# Patient Record
(demographics unavailable — no encounter records)

---

## 2025-03-04 NOTE — ASSESSMENT
[FreeTextEntry1] : chronic constipation a/w intermittent rectal bleeding and iron deficiency.  labs as listed colonoscopy recommended to r/o lesion Risks (including but not limited to sedation risks, infection, bleeding, perforation, possibility of missed lesions, spleen injury), benefits and alternatives to procedure, including not doing the procedure, were discussed with patient. Patient understood and agreed to proceed with colonoscopy. Colonoscopy preparation instructions reviewed with patient. 2 Dulcolax two days prior to procedure + Split MiraLAX/Dulcolax preparation starting day prior to procedure  daily MiraLAX for goal 1 bm daily - QOD  iron deficiency -outside labs requested -check labs -EGD/Colonoscopy to r/o GI source.  Risks (including but not limited to sedation risks, infection, bleeding, perforation), benefits and alternatives to procedure, including not doing the procedure, were discussed with patient. Patient understood and agreed to proceed with EGD EGD preparation instructions reviewed with patient. -minimize NSAIDS

## 2025-03-04 NOTE — HISTORY OF PRESENT ILLNESS
[FreeTextEntry1] : 22 year old F presents for the evaluation of chronic constipation, iron deficiency  and rectal bleeding.  seen at the request of Dr. Chloé Acosta,   h/o chronic constipation-most of her life.  she has bm qQOD- every 3 days drinks coffee to have a bm  had a egd/colonoscopy when she was younger-age 15- 16-, eating d/o contributed to constipation. used to be addicted to laxatives- dulcolax, exlax. she believes results were unremarkable.  she is pescatarian . does not get her period on current OCP so last period 1.5 years ago.   white stuff in stool for last few months. lot of mucous recently. very constipated 2 weeks ago when skiing. Had abd pain.  was only able to mass mucous. took a laxative with good result, abd pain resolved.  some BRB on occasion, -mostly on tissue with wiping.   started taking iron pills summer /fall 2024. was also started on fiber pills at that time to help manage constipation. fiber did help with constipation.   takes advil frequently for HA, and for MSK pain in hip/low back   has occ heartburn weight currently stable at 104 lbs  Patient denies n/v, dysphagia/odynophagia, change in bowel habits, diarrhea,  melena. no weight loss. no jaundice/pruritus/fatigue.  Good appetite.  PCP Chloé Acosta  Soc: no tobacco or significant EtOH   Family Hx: no significant GI family history including colon cancer, stomach cancer, IBD, celiac   ROS: constitutional: no weight loss, fevers ENT: no deafness Eyes: no blindness Neck: no lymphadenopathy Chest: no shortness of breath, no cough Cardiac: no chest pain, no palpitations Vascular: no leg swelling GI: as noted in hpi : no dysuria, dark urine Skin: no rashes, lesions, jaundice Heme: no bleeding Endocrine: no DM unless otherwise stated in HPI   Physical Exam: (VS noted below) General: alert, comfortable, in no acute distress Eyes: normal sclera, anicteric Neck: normal, supple, no neck mass Pulm: no respiratory distress, clear to auscultation bilaterally Heart: RRR, normal S1 S2 Abd: Soft, non-tender, non-distended, normal bowel sounds, no appreciable hepatosplenomegaly, no masses palpated Rectal: normal external exam. normal JENNIFER. white mucous on glove. Chaperoned by Joaquina Silvestre RN.  Ext: warm and well perfused, no edema Skin: no rashes, no jaundice Neuro: alert, grossly nonfocal   Labs/imaging/prior endoscopic results were reviewed to the extent available and pertinent findings noted in HPI

## 2025-04-10 NOTE — PHYSICAL EXAM
[General Appearance - Alert] : alert [General Appearance - In No Acute Distress] : in no acute distress [General Appearance - Well Nourished] : well nourished [General Appearance - Well Developed] : well developed [Sclera] : the sclera and conjunctiva were normal [] : no respiratory distress [Auscultation Breath Sounds / Voice Sounds] : lungs were clear to auscultation bilaterally [Cervical Lymph Nodes Enlarged Posterior Bilaterally] : posterior cervical [Cervical Lymph Nodes Enlarged Anterior Bilaterally] : anterior cervical [FreeTextEntry1] : There is no evidence of active synovitis throughout all joints examined. There is no dactylitis or plantar findings There is no pain on stressing the SI joints during provocative testing.

## 2025-04-10 NOTE — HISTORY OF PRESENT ILLNESS
[FreeTextEntry1] : Patient was Dx'ed with ulcerative proctitis symptoms of which possibly started years ago. She also reports having years of intermittent pain and discomfort in the lateral aspect of the left hip and low back with intermittent AM stiffness for years as well. There are no plantar symptoms or Hx of dactylitis.  Has a Hx of allergic urticaria treated by an allergist. There has been no serositis, fever, ocular symptoms or  symptoms.

## 2025-04-10 NOTE — REVIEW OF SYSTEMS
[Abdominal Pain] : abdominal pain [Constipation] : constipation [Joint Pain] : joint pain [Joint Stiffness] : joint stiffness [Skin Lesions] : skin lesion [Fever] : no fever [Chills] : no chills [Eye Pain] : no eye pain [Red Eyes] : eyes not red [Shortness Of Breath] : no shortness of breath [Cough] : no cough [Dysuria] : no dysuria [FreeTextEntry6] : No pleuritic C/P

## 2025-04-11 NOTE — ASSESSMENT
Subjective:       Patient ID: Sangita Logan is a 84 y.o. female.    Chief Complaint: COPD    HPI:   Sangita Logan is a 84 y.o. female new to me last seen by Dr. Sanchez on 9/16/22 who presents for follow up of COPD.    2 exacerbations of her copd in the last several months. 1 exacerbation occurred when she ran out of her prescription. On bevespi and prn albuterol. Variable respiratory symptoms day to day. Some days can walk 1-2 blocks, other she can walk several blocks.     Denies sig sinus sx or GERD sx    Exposures:  Tobacco- Smoking 1/3ppd for 30 years.   Inhalational Agents- Denies  Mold- Denies  Pets- Denies  Birds- Denies     Family History of Lung Disease: Denies  Childhood History of Lung Disease: Denies    Review of Systems    As above    Social History     Tobacco Use    Smoking status: Every Day     Current packs/day: 0.50     Average packs/day: 0.5 packs/day for 30.0 years (15.0 ttl pk-yrs)     Types: Cigarettes     Passive exposure: Current    Smokeless tobacco: Never    Tobacco comments:     2 packs a week   Substance Use Topics    Alcohol use: Yes     Alcohol/week: 3.0 standard drinks of alcohol     Types: 3 Glasses of wine per week     Comment: 4 times weekly       Review of patient's allergies indicates:   Allergen Reactions    No known drug allergies      Past Medical History:   Diagnosis Date    COPD (chronic obstructive pulmonary disease)     HTN (hypertension) 10/21/2021    Osteoporosis     Sinus disease      Past Surgical History:   Procedure Laterality Date    AUGMENTATION OF BREAST Bilateral     received at age 25, removed at age 78    Breast implants removal  2012    SINUS SURGERY      TONSILLECTOMY      Age 3     Current Outpatient Medications on File Prior to Visit   Medication Sig    albuterol (PROVENTIL) 2.5 mg /3 mL (0.083 %) nebulizer solution Take 3 mLs (2.5 mg total) by nebulization every 6 (six) hours as needed for Wheezing. Rescue    aspirin (ECOTRIN) 81 MG EC  "tablet Take 1 tablet (81 mg total) by mouth once daily.    busPIRone (BUSPAR) 10 MG tablet Take 1/2 tablet (5 mg) three times daily for first week, then 1 tablet twice daily thereafter.    carvediloL (COREG) 6.25 MG tablet TAKE 1 TABLET(6.25 MG) BY MOUTH TWICE DAILY WITH MEALS    multivitamin-Ca-iron-minerals (MULTIPLE VITAMIN, WOMENS) Tab Take 1 tablet by mouth Daily.    rosuvastatin (CRESTOR) 20 MG tablet Take 1 tablet (20 mg total) by mouth once daily. (Patient taking differently: Take 20 mg by mouth every evening.)     No current facility-administered medications on file prior to visit.       Objective:      Vitals:    12/13/23 1402   BP: 110/60   BP Location: Right arm   Patient Position: Sitting   Pulse: 86   SpO2: 97%   Weight: 49.8 kg (109 lb 12.6 oz)   Height: 5' 4" (1.626 m)     Physical Exam   Constitutional: She appears well-developed and well-nourished.   HENT:   Nose: No mucosal edema.   Mouth/Throat: Oropharynx is clear and moist. Mallampati Score: I.   Neck: No tracheal deviation present.   Cardiovascular: Normal rate and regular rhythm.   Pulmonary/Chest: Normal expansion, symmetric chest wall expansion, effort normal and breath sounds normal. No respiratory distress. She has no wheezes. She has no rhonchi. She has no rales.   Abdominal: She exhibits no distension.   Musculoskeletal:         General: No edema.      Cervical back: Neck supple.   Lymphadenopathy: No supraclavicular adenopathy is present.     She has no cervical adenopathy.   Skin: Skin is warm and dry. No cyanosis or erythema. Nails show no clubbing.   Nursing note and vitals reviewed.      Personal Diagnostic Review    Laboratory:  11/10/23  Bicarb- 23  Eosinophils- 0.0      CT Chest 5/12/22- Images personally reviewed. I agree w/ the radiologist who notes;  1. Increased size of a right lower lobe branching tubular opacity favored to represent mucoid impaction within a distal bronchiole related to infectious/inflammatory airways " [FreeTextEntry1] : Proctitis, likely ulcerative proctitis reviewed natural history of disease, causes, treatment options, prognosis.  submit stool tests r/o infection discussed possible EIM- referred to Rheum and Derm will plan to start canasa suppository.  if she has difficulty with suppository , will plan for oral mesalamine counseled patient on risk of side effects follow up in 3 weeks to assess response will plan for flex sig in 3 months with anesthesia to confirm remission.  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   iron deficiency- improved on recent labs will hold iron for now as patient feels it contributes to constipation. will monitor  hip pain/rash ?EIM of IBD- referred to Rheum and Derm for evaluation.  disease.  Recommend pulmonology consultation and follow-up chest CT in 6 months.  2. Multiple stable bilateral pulmonary micronodules.  3. Centrilobular emphysema.  4. Stable nonspecific high density nodule within the left anterior chest wall.    CXR 11/10/23- Images personally reviewed. I agree w/ the radiologist who notes;  1. Chronic appearing interstitial findings suggesting COPD/emphysema.  No large focal consolidation.     PFTs   12/13/23  FVC: 1.97 (87.8 % predicted), FEV1: 1.14 (67.5 % predicted), FEV1/FVC:  58, TLC: 3.76 (81.6 % predicted), DLCO: 6.64 (36.9 % predicted)    TTE 12/10/21  The left ventricle is normal in size with mild concentric hypertrophy and normal systolic function.  The estimated ejection fraction is 55%.  Grade I left ventricular diastolic dysfunction.  Normal right ventricular size with normal right ventricular systolic function.  Mild aortic regurgitation.  Mild mitral regurgitation.  Mild tricuspid regurgitation.  Normal central venous pressure (3 mmHg).  The estimated PA systolic pressure is 21 mmHg.        Assessment:     Problem List Items Addressed This Visit          Pulmonary    COPD, group A, by GOLD 2017 classification - Primary     On bevespi and prn IVAN. 2 exacerbations this year, but one was when she ran out of her inhaler. Respiratory symptoms stable. Cont to smoke 1/3ppd.     - Strongly encouraged smoking cessation  - Cont bevespi and prn albuterol  - UTD on vaccines  - Encouraged regular, progressive exercise  - CT Chest ordered to f/u likely mucoid impaction to ensure this isn't an endobronchial mass         Relevant Medications    BEVESPI AEROSPHERE 9-4.8 mcg HFAA    Multiple pulmonary nodules     CT Chest as above            Other    Cigarette nicotine dependence without complication     Strongly encouraged cessation           Other Visit Diagnoses       Pulmonary nodule        Relevant Orders    CT Chest Without Contrast            30 minutes of total time spent on  the encounter, which includes face to face time and non-face to face time preparing to see the patient (eg, review of tests), Obtaining and/or reviewing separately obtained history, Documenting clinical information in the electronic or other health record, Independently interpreting results (not separately reported) and communicating results to the patient/family/caregiver, or Care coordination (not separately reported).

## 2025-04-11 NOTE — HISTORY OF PRESENT ILLNESS
[FreeTextEntry1] : 23 yo Fpresents for follow up of chronic constipation, iron deficiency  and rectal bleeding.  recnet labs , normal CBC, CMET, iron studies Ferritie now 30, b12/folate, crp, negative cealic serologies, normal tsh.   back to baseline after procedure. she has had intermittent rectal pain, brb  and mucous on tissue for 1-2 years.  denies NSAID use. Has not yet submitted stool tests.  Had norovirus when in [place]  3 months ago, only had vomiting at that time.  takes allerflex  due to occasional hives which she started having after she had mono a few years ago.  has intermittent severe hip pain-unclear etiology-, takes NSAID occasionally for this. will see PMR&R.   EGD/Colonoscopy performed.  mild irregular zline, anal fissure, protitis 0-4 cm. 2 mm descending colon polyp,  A. DUODENUM, BIOPSY: -Duodenal mucosa with peptic duodenitis. -No evidence of celiac disease or parasites seen.  B. STOMACH, ANTRUM, BIOPSY: -Gastric antral mucosa with foveolar hyperplasia and mild chronic gastritis. -No intestinal metaplasia or dysplasia present. -No Helicobacter pylori seen on Diff Quik stain.  C. STOMACH, BODY, BIOPSY: -Gastric oxyntic mucosa with mild chronic gastritis. -No intestinal metaplasia or dysplasia present. -No Helicobacter pylori seen on Diff Quik stain.  D. GE JUNCTION, 36 CM, BIOPSY: -Squamocolumnar junctional mucosa with acute and chronic inflammation and changes consistent with reflux esophagitis. -No intestinal metaplasia or dysplasia present. -No increase in intraepithelial eosinophils seen.  E. DESCENDING COLON, POLYP, BIOPSY: -Polypoid fragment of colonic mucosa with hyperplastic features and prominent lymphoid aggregate. -No adenoma present.  F. TERMINAL ILEUM, BIOPSY: -Small bowel mucosa with no diagnostic abnormalities. -No active inflammation, granulomas, cryptitis, or dysplasia present.  G. CECUM, BIOPSY: -Colonic mucosa with no diagnostic abnormalities. -No active inflammation, granulomas, cryptitis, or dysplasia present.  H. ASCENDING COLON, BIOPSY: -Colonic mucosa with no diagnostic abnormalities. -No active inflammation, granulomas, cryptitis, or dysplasia present.  I. TRANSVERSE COLON, BIOPSY: -Colonic mucosa with no diagnostic abnormalities. -No active inflammation, granulomas, cryptitis, or dysplasia present.  J. DESCENDING COLON, BIOPSY: -Colonic mucosa with no diagnostic abnormalities. -No active inflammation, granulomas, cryptitis, or dysplasia present.  K. SIGMOID COLON, BIOPSY:   Specimen: [id number]            Received: 03/24/                (Continued)  FINAL DIAGNOSIS                  (Continued)  -Colonic mucosa with no diagnostic abnormalities. -No active inflammation, granulomas, cryptitis, or dysplasia present.  L. RECTUM, BIOPSY: -Colonic mucosa with marked active colitis, see comment. -No granulomas or dysplasia present.  DIAGNOSIS COMMENT  Part L: There are no granulomas or significant architectural distortion present.  While the findings may be consistent with an acute self-limited colitis, the possibility of inflammatory bowel disease cannot be completely excluded.  Recommend clinical and endoscopic correlation.  eloise hx.   h/o chronic constipation-most of her life.  she has bm qQOD- every 3 days drinks coffee to have a bm  had a egd/colonoscopy when she was younger-age 15- 16-, eating d/o contributed to constipation. used to be addicted to laxatives- dulcolax, exlax. she believes results were unremarkable.  she is pescatarian . does not get her period on current OCP so last period 1.5 years ago.   white stuff in stool for last few months. lot of mucous recently. very constipated 2 weeks ago when skiing. Had abd pain.  was only able to mass mucous. took a laxative with good result, abd pain resolved.  some BRB on occasion, -mostly on tissue with wiping.   started taking iron pills summer /fall 2024. was also started on fiber pills at that time to help manage constipation. fiber did help with constipation.   takes advil frequently for HA, and for MSK pain in hip/low back   has occ heartburn weight currently stable at 104 lbs  Patient denies n/v, dysphagia/odynophagia, change in bowel habits, diarrhea,  melena. no weight loss. no jaundice/pruritus/fatigue.  Good appetite.  PCP Chloé Acosta  Soc: no tobacco or significant EtOH   Family Hx: no significant GI family history including colon cancer, stomach cancer, IBD, celiac   ROS: constitutional: no weight loss, fevers ENT: no deafness Eyes: no blindness Neck: no lymphadenopathy Chest: no shortness of breath, no cough Cardiac: no chest pain, no palpitations Vascular: no leg swelling GI: as noted in hpi : no dysuria, dark urine Skin: no rashes, lesions, jaundice Heme: no bleeding Endocrine: no DM unless otherwise stated in HPI   Physical Exam: (VS noted below) General: alert, comfortable, in no acute distress Eyes: normal sclera, anicteric Neck: normal, supple, no neck mass Pulm: no respiratory distress, clear to auscultation bilaterally Heart: RRR, normal S1 S2 Abd: Soft, non-tender, non-distended, normal bowel sounds, no appreciable hepatosplenomegaly, no masses palpated Rectal: normal external exam. normal JENNIFER. white mucous on glove. Chaperoned by Joaquina Silvestre RN.  Ext: warm and well perfused, no edema Skin: no rashes, no jaundice Neuro: alert, grossly nonfocal   Labs/imaging/prior endoscopic results were reviewed to the extent available and pertinent findings noted in HPI

## 2025-04-11 NOTE — HISTORY OF PRESENT ILLNESS
[FreeTextEntry1] : 23 yo Fpresents for follow up of chronic constipation, iron deficiency  and rectal bleeding.  recnet labs , normal CBC, CMET, iron studies Ferritie now 30, b12/folate, crp, negative cealic serologies, normal tsh.   back to baseline after procedure. she has had intermittent rectal pain, brb  and mucous on tissue for 1-2 years.  denies NSAID use. Has not yet submitted stool tests.  Had norovirus when in [place]  3 months ago, only had vomiting at that time.  takes allerflex  due to occasional hives which she started having after she had mono a few years ago.  has intermittent severe hip pain-unclear etiology-, takes NSAID occasionally for this. will see PMR&R.   EGD/Colonoscopy performed.  mild irregular zline, anal fissure, protitis 0-4 cm. 2 mm descending colon polyp,  A. DUODENUM, BIOPSY: -Duodenal mucosa with peptic duodenitis. -No evidence of celiac disease or parasites seen.  B. STOMACH, ANTRUM, BIOPSY: -Gastric antral mucosa with foveolar hyperplasia and mild chronic gastritis. -No intestinal metaplasia or dysplasia present. -No Helicobacter pylori seen on Diff Quik stain.  C. STOMACH, BODY, BIOPSY: -Gastric oxyntic mucosa with mild chronic gastritis. -No intestinal metaplasia or dysplasia present. -No Helicobacter pylori seen on Diff Quik stain.  D. GE JUNCTION, 36 CM, BIOPSY: -Squamocolumnar junctional mucosa with acute and chronic inflammation and changes consistent with reflux esophagitis. -No intestinal metaplasia or dysplasia present. -No increase in intraepithelial eosinophils seen.  E. DESCENDING COLON, POLYP, BIOPSY: -Polypoid fragment of colonic mucosa with hyperplastic features and prominent lymphoid aggregate. -No adenoma present.  F. TERMINAL ILEUM, BIOPSY: -Small bowel mucosa with no diagnostic abnormalities. -No active inflammation, granulomas, cryptitis, or dysplasia present.  G. CECUM, BIOPSY: -Colonic mucosa with no diagnostic abnormalities. -No active inflammation, granulomas, cryptitis, or dysplasia present.  H. ASCENDING COLON, BIOPSY: -Colonic mucosa with no diagnostic abnormalities. -No active inflammation, granulomas, cryptitis, or dysplasia present.  I. TRANSVERSE COLON, BIOPSY: -Colonic mucosa with no diagnostic abnormalities. -No active inflammation, granulomas, cryptitis, or dysplasia present.  J. DESCENDING COLON, BIOPSY: -Colonic mucosa with no diagnostic abnormalities. -No active inflammation, granulomas, cryptitis, or dysplasia present.  K. SIGMOID COLON, BIOPSY:   Specimen: [id number]            Received: 03/24/                (Continued)  FINAL DIAGNOSIS                  (Continued)  -Colonic mucosa with no diagnostic abnormalities. -No active inflammation, granulomas, cryptitis, or dysplasia present.  L. RECTUM, BIOPSY: -Colonic mucosa with marked active colitis, see comment. -No granulomas or dysplasia present.  DIAGNOSIS COMMENT  Part L: There are no granulomas or significant architectural distortion present.  While the findings may be consistent with an acute self-limited colitis, the possibility of inflammatory bowel disease cannot be completely excluded.  Recommend clinical and endoscopic correlation.  eloise hx.   h/o chronic constipation-most of her life.  she has bm qQOD- every 3 days drinks coffee to have a bm  had a egd/colonoscopy when she was younger-age 15- 16-, eating d/o contributed to constipation. used to be addicted to laxatives- dulcolax, exlax. she believes results were unremarkable.  she is pescatarian . does not get her period on current OCP so last period 1.5 years ago.   white stuff in stool for last few months. lot of mucous recently. very constipated 2 weeks ago when skiing. Had abd pain.  was only able to mass mucous. took a laxative with good result, abd pain resolved.  some BRB on occasion, -mostly on tissue with wiping.   started taking iron pills summer /fall 2024. was also started on fiber pills at that time to help manage constipation. fiber did help with constipation.   takes advil frequently for HA, and for MSK pain in hip/low back   has occ heartburn weight currently stable at 104 lbs  Patient denies n/v, dysphagia/odynophagia, change in bowel habits, diarrhea,  melena. no weight loss. no jaundice/pruritus/fatigue.  Good appetite.  PCP Chloé Acosta  Soc: no tobacco or significant EtOH   Family Hx: no significant GI family history including colon cancer, stomach cancer, IBD, celiac   ROS: constitutional: no weight loss, fevers ENT: no deafness Eyes: no blindness Neck: no lymphadenopathy Chest: no shortness of breath, no cough Cardiac: no chest pain, no palpitations Vascular: no leg swelling GI: as noted in hpi : no dysuria, dark urine Skin: no rashes, lesions, jaundice Heme: no bleeding Endocrine: no DM unless otherwise stated in HPI   Physical Exam: (VS noted below) General: alert, comfortable, in no acute distress Eyes: normal sclera, anicteric Neck: normal, supple, no neck mass Pulm: no respiratory distress, clear to auscultation bilaterally Heart: RRR, normal S1 S2 Abd: Soft, non-tender, non-distended, normal bowel sounds, no appreciable hepatosplenomegaly, no masses palpated Rectal: normal external exam. normal JENNIFER. white mucous on glove. Chaperoned by Joaquina Silvestre RN.  Ext: warm and well perfused, no edema Skin: no rashes, no jaundice Neuro: alert, grossly nonfocal   Labs/imaging/prior endoscopic results were reviewed to the extent available and pertinent findings noted in HPI

## 2025-04-11 NOTE — ADDENDUM
[FreeTextEntry1] : 4/11/25- spoke with patient- reviewed stool tests. negative for infection . fecal calprotectin elevated. c/w active ulcerative proctitis.  She started canasa suppositories . Started medrol per Dr. Conley for joint pain.  f/u scheduled 4/23.  patient aware I am out of the office next week.

## 2025-04-11 NOTE — HISTORY OF PRESENT ILLNESS
[FreeTextEntry1] : 21 yo Fpresents for follow up of chronic constipation, iron deficiency  and rectal bleeding.  recnet labs , normal CBC, CMET, iron studies Ferritie now 30, b12/folate, crp, negative cealic serologies, normal tsh.   back to baseline after procedure. she has had intermittent rectal pain, brb  and mucous on tissue for 1-2 years.  denies NSAID use. Has not yet submitted stool tests.  Had norovirus when in [place]  3 months ago, only had vomiting at that time.  takes allerflex  due to occasional hives which she started having after she had mono a few years ago.  has intermittent severe hip pain-unclear etiology-, takes NSAID occasionally for this. will see PMR&R.   EGD/Colonoscopy performed.  mild irregular zline, anal fissure, protitis 0-4 cm. 2 mm descending colon polyp,  A. DUODENUM, BIOPSY: -Duodenal mucosa with peptic duodenitis. -No evidence of celiac disease or parasites seen.  B. STOMACH, ANTRUM, BIOPSY: -Gastric antral mucosa with foveolar hyperplasia and mild chronic gastritis. -No intestinal metaplasia or dysplasia present. -No Helicobacter pylori seen on Diff Quik stain.  C. STOMACH, BODY, BIOPSY: -Gastric oxyntic mucosa with mild chronic gastritis. -No intestinal metaplasia or dysplasia present. -No Helicobacter pylori seen on Diff Quik stain.  D. GE JUNCTION, 36 CM, BIOPSY: -Squamocolumnar junctional mucosa with acute and chronic inflammation and changes consistent with reflux esophagitis. -No intestinal metaplasia or dysplasia present. -No increase in intraepithelial eosinophils seen.  E. DESCENDING COLON, POLYP, BIOPSY: -Polypoid fragment of colonic mucosa with hyperplastic features and prominent lymphoid aggregate. -No adenoma present.  F. TERMINAL ILEUM, BIOPSY: -Small bowel mucosa with no diagnostic abnormalities. -No active inflammation, granulomas, cryptitis, or dysplasia present.  G. CECUM, BIOPSY: -Colonic mucosa with no diagnostic abnormalities. -No active inflammation, granulomas, cryptitis, or dysplasia present.  H. ASCENDING COLON, BIOPSY: -Colonic mucosa with no diagnostic abnormalities. -No active inflammation, granulomas, cryptitis, or dysplasia present.  I. TRANSVERSE COLON, BIOPSY: -Colonic mucosa with no diagnostic abnormalities. -No active inflammation, granulomas, cryptitis, or dysplasia present.  J. DESCENDING COLON, BIOPSY: -Colonic mucosa with no diagnostic abnormalities. -No active inflammation, granulomas, cryptitis, or dysplasia present.  K. SIGMOID COLON, BIOPSY:   Specimen: [id number]            Received: 03/24/                (Continued)  FINAL DIAGNOSIS                  (Continued)  -Colonic mucosa with no diagnostic abnormalities. -No active inflammation, granulomas, cryptitis, or dysplasia present.  L. RECTUM, BIOPSY: -Colonic mucosa with marked active colitis, see comment. -No granulomas or dysplasia present.  DIAGNOSIS COMMENT  Part L: There are no granulomas or significant architectural distortion present.  While the findings may be consistent with an acute self-limited colitis, the possibility of inflammatory bowel disease cannot be completely excluded.  Recommend clinical and endoscopic correlation.  eloise hx.   h/o chronic constipation-most of her life.  she has bm qQOD- every 3 days drinks coffee to have a bm  had a egd/colonoscopy when she was younger-age 15- 16-, eating d/o contributed to constipation. used to be addicted to laxatives- dulcolax, exlax. she believes results were unremarkable.  she is pescatarian . does not get her period on current OCP so last period 1.5 years ago.   white stuff in stool for last few months. lot of mucous recently. very constipated 2 weeks ago when skiing. Had abd pain.  was only able to mass mucous. took a laxative with good result, abd pain resolved.  some BRB on occasion, -mostly on tissue with wiping.   started taking iron pills summer /fall 2024. was also started on fiber pills at that time to help manage constipation. fiber did help with constipation.   takes advil frequently for HA, and for MSK pain in hip/low back   has occ heartburn weight currently stable at 104 lbs  Patient denies n/v, dysphagia/odynophagia, change in bowel habits, diarrhea,  melena. no weight loss. no jaundice/pruritus/fatigue.  Good appetite.  PCP Chloé Acosta  Soc: no tobacco or significant EtOH   Family Hx: no significant GI family history including colon cancer, stomach cancer, IBD, celiac   ROS: constitutional: no weight loss, fevers ENT: no deafness Eyes: no blindness Neck: no lymphadenopathy Chest: no shortness of breath, no cough Cardiac: no chest pain, no palpitations Vascular: no leg swelling GI: as noted in hpi : no dysuria, dark urine Skin: no rashes, lesions, jaundice Heme: no bleeding Endocrine: no DM unless otherwise stated in HPI   Physical Exam: (VS noted below) General: alert, comfortable, in no acute distress Eyes: normal sclera, anicteric Neck: normal, supple, no neck mass Pulm: no respiratory distress, clear to auscultation bilaterally Heart: RRR, normal S1 S2 Abd: Soft, non-tender, non-distended, normal bowel sounds, no appreciable hepatosplenomegaly, no masses palpated Rectal: normal external exam. normal JENNIFER. white mucous on glove. Chaperoned by Joaquina Silvestre RN.  Ext: warm and well perfused, no edema Skin: no rashes, no jaundice Neuro: alert, grossly nonfocal   Labs/imaging/prior endoscopic results were reviewed to the extent available and pertinent findings noted in HPI

## 2025-04-11 NOTE — ASSESSMENT
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  submit stool tests r/o infection discussed possible EIM- referred to Rheum and Derm will plan to start canasa suppository.  if she has difficulty with suppository , will plan for oral mesalamine counseled patient on risk of side effects follow up in 3 weeks to assess response will plan for flex sig in 3 months with anesthesia to confirm remission.  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   iron deficiency- improved on recent labs will hold iron for now as patient feels it contributes to constipation. will monitor  hip pain/rash ?EIM of IBD- referred to Rheum and Derm for evaluation.

## 2025-04-11 NOTE — ASSESSMENT
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  submit stool tests r/o infection discussed possible EIM- referred to Rheum and Derm will plan to start canasa suppository.  if she has difficulty with suppository , will plan for oral mesalamine counseled patient on risk of side effects follow up in 3 weeks to assess response will plan for flex sig in 3 months with anesthesia to confirm remission.  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   iron deficiency- improved on recent labs will hold iron for now as patient feels it contributes to constipation. will monitor  hip pain/rash ?EIM of IBD- referred to Rheum and Derm for evaluation.

## 2025-04-11 NOTE — HISTORY OF PRESENT ILLNESS
[FreeTextEntry1] : 21 yo Fpresents for follow up of chronic constipation, iron deficiency  and rectal bleeding.  recnet labs , normal CBC, CMET, iron studies Ferritie now 30, b12/folate, crp, negative cealic serologies, normal tsh.   back to baseline after procedure. she has had intermittent rectal pain, brb  and mucous on tissue for 1-2 years.  denies NSAID use. Has not yet submitted stool tests.  Had norovirus when in [place]  3 months ago, only had vomiting at that time.  takes allerflex  due to occasional hives which she started having after she had mono a few years ago.  has intermittent severe hip pain-unclear etiology-, takes NSAID occasionally for this. will see PMR&R.   EGD/Colonoscopy performed.  mild irregular zline, anal fissure, protitis 0-4 cm. 2 mm descending colon polyp,  A. DUODENUM, BIOPSY: -Duodenal mucosa with peptic duodenitis. -No evidence of celiac disease or parasites seen.  B. STOMACH, ANTRUM, BIOPSY: -Gastric antral mucosa with foveolar hyperplasia and mild chronic gastritis. -No intestinal metaplasia or dysplasia present. -No Helicobacter pylori seen on Diff Quik stain.  C. STOMACH, BODY, BIOPSY: -Gastric oxyntic mucosa with mild chronic gastritis. -No intestinal metaplasia or dysplasia present. -No Helicobacter pylori seen on Diff Quik stain.  D. GE JUNCTION, 36 CM, BIOPSY: -Squamocolumnar junctional mucosa with acute and chronic inflammation and changes consistent with reflux esophagitis. -No intestinal metaplasia or dysplasia present. -No increase in intraepithelial eosinophils seen.  E. DESCENDING COLON, POLYP, BIOPSY: -Polypoid fragment of colonic mucosa with hyperplastic features and prominent lymphoid aggregate. -No adenoma present.  F. TERMINAL ILEUM, BIOPSY: -Small bowel mucosa with no diagnostic abnormalities. -No active inflammation, granulomas, cryptitis, or dysplasia present.  G. CECUM, BIOPSY: -Colonic mucosa with no diagnostic abnormalities. -No active inflammation, granulomas, cryptitis, or dysplasia present.  H. ASCENDING COLON, BIOPSY: -Colonic mucosa with no diagnostic abnormalities. -No active inflammation, granulomas, cryptitis, or dysplasia present.  I. TRANSVERSE COLON, BIOPSY: -Colonic mucosa with no diagnostic abnormalities. -No active inflammation, granulomas, cryptitis, or dysplasia present.  J. DESCENDING COLON, BIOPSY: -Colonic mucosa with no diagnostic abnormalities. -No active inflammation, granulomas, cryptitis, or dysplasia present.  K. SIGMOID COLON, BIOPSY:   Specimen: [id number]            Received: 03/24/                (Continued)  FINAL DIAGNOSIS                  (Continued)  -Colonic mucosa with no diagnostic abnormalities. -No active inflammation, granulomas, cryptitis, or dysplasia present.  L. RECTUM, BIOPSY: -Colonic mucosa with marked active colitis, see comment. -No granulomas or dysplasia present.  DIAGNOSIS COMMENT  Part L: There are no granulomas or significant architectural distortion present.  While the findings may be consistent with an acute self-limited colitis, the possibility of inflammatory bowel disease cannot be completely excluded.  Recommend clinical and endoscopic correlation.  eloise hx.   h/o chronic constipation-most of her life.  she has bm qQOD- every 3 days drinks coffee to have a bm  had a egd/colonoscopy when she was younger-age 15- 16-, eating d/o contributed to constipation. used to be addicted to laxatives- dulcolax, exlax. she believes results were unremarkable.  she is pescatarian . does not get her period on current OCP so last period 1.5 years ago.   white stuff in stool for last few months. lot of mucous recently. very constipated 2 weeks ago when skiing. Had abd pain.  was only able to mass mucous. took a laxative with good result, abd pain resolved.  some BRB on occasion, -mostly on tissue with wiping.   started taking iron pills summer /fall 2024. was also started on fiber pills at that time to help manage constipation. fiber did help with constipation.   takes advil frequently for HA, and for MSK pain in hip/low back   has occ heartburn weight currently stable at 104 lbs  Patient denies n/v, dysphagia/odynophagia, change in bowel habits, diarrhea,  melena. no weight loss. no jaundice/pruritus/fatigue.  Good appetite.  PCP Chloé Acosta  Soc: no tobacco or significant EtOH   Family Hx: no significant GI family history including colon cancer, stomach cancer, IBD, celiac   ROS: constitutional: no weight loss, fevers ENT: no deafness Eyes: no blindness Neck: no lymphadenopathy Chest: no shortness of breath, no cough Cardiac: no chest pain, no palpitations Vascular: no leg swelling GI: as noted in hpi : no dysuria, dark urine Skin: no rashes, lesions, jaundice Heme: no bleeding Endocrine: no DM unless otherwise stated in HPI   Physical Exam: (VS noted below) General: alert, comfortable, in no acute distress Eyes: normal sclera, anicteric Neck: normal, supple, no neck mass Pulm: no respiratory distress, clear to auscultation bilaterally Heart: RRR, normal S1 S2 Abd: Soft, non-tender, non-distended, normal bowel sounds, no appreciable hepatosplenomegaly, no masses palpated Rectal: normal external exam. normal JENNIFER. white mucous on glove. Chaperoned by Joaquina Silvestre RN.  Ext: warm and well perfused, no edema Skin: no rashes, no jaundice Neuro: alert, grossly nonfocal   Labs/imaging/prior endoscopic results were reviewed to the extent available and pertinent findings noted in HPI

## 2025-04-11 NOTE — ASSESSMENT
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  submit stool tests r/o infection discussed possible EIM- referred to Rheum and Derm will plan to start canasa suppository.  if she has difficulty with suppository , will plan for oral mesalamine counseled patient on risk of side effects follow up in 3 weeks to assess response will plan for flex sig in 3 months with anesthesia to confirm remission.  minimize NSAIDS she [place] moving to [place] soon for [place], encouraged her to establish with physician locally.   iron deficiency- improved on recent labs will hold iron for now as patient feels it contributes to constipation. will monitor  hip pain/rash ?EIM of IBD- referred to Rheum and Derm for evaluation.

## 2025-04-11 NOTE — HISTORY OF PRESENT ILLNESS
[FreeTextEntry1] : 21 yo Fpresents for follow up of chronic constipation, iron deficiency  and rectal bleeding.  recnet labs , normal CBC, CMET, iron studies Ferritie now 30, b12/folate, crp, negative cealic serologies, normal tsh.   back to baseline after procedure. she has had intermittent rectal pain, brb  and mucous on tissue for 1-2 years.  denies NSAID use. Has not yet submitted stool tests.  Had norovirus when in [place]  3 months ago, only had vomiting at that time.  takes allerflex  due to occasional hives which she started having after she had mono a few years ago.  has intermittent severe hip pain-unclear etiology-, takes NSAID occasionally for this. will see PMR&R.   EGD/Colonoscopy performed.  mild irregular zline, anal fissure, protitis 0-4 cm. 2 mm descending colon polyp,  A. DUODENUM, BIOPSY: -Duodenal mucosa with peptic duodenitis. -No evidence of celiac disease or parasites seen.  B. STOMACH, ANTRUM, BIOPSY: -Gastric antral mucosa with foveolar hyperplasia and mild chronic gastritis. -No intestinal metaplasia or dysplasia present. -No Helicobacter pylori seen on Diff Quik stain.  C. STOMACH, BODY, BIOPSY: -Gastric oxyntic mucosa with mild chronic gastritis. -No intestinal metaplasia or dysplasia present. -No Helicobacter pylori seen on Diff Quik stain.  D. GE JUNCTION, 36 CM, BIOPSY: -Squamocolumnar junctional mucosa with acute and chronic inflammation and changes consistent with reflux esophagitis. -No intestinal metaplasia or dysplasia present. -No increase in intraepithelial eosinophils seen.  E. DESCENDING COLON, POLYP, BIOPSY: -Polypoid fragment of colonic mucosa with hyperplastic features and prominent lymphoid aggregate. -No adenoma present.  F. TERMINAL ILEUM, BIOPSY: -Small bowel mucosa with no diagnostic abnormalities. -No active inflammation, granulomas, cryptitis, or dysplasia present.  G. CECUM, BIOPSY: -Colonic mucosa with no diagnostic abnormalities. -No active inflammation, granulomas, cryptitis, or dysplasia present.  H. ASCENDING COLON, BIOPSY: -Colonic mucosa with no diagnostic abnormalities. -No active inflammation, granulomas, cryptitis, or dysplasia present.  I. TRANSVERSE COLON, BIOPSY: -Colonic mucosa with no diagnostic abnormalities. -No active inflammation, granulomas, cryptitis, or dysplasia present.  J. DESCENDING COLON, BIOPSY: -Colonic mucosa with no diagnostic abnormalities. -No active inflammation, granulomas, cryptitis, or dysplasia present.  K. SIGMOID COLON, BIOPSY:   Specimen: [id number]            Received: 03/24/                (Continued)  FINAL DIAGNOSIS                  (Continued)  -Colonic mucosa with no diagnostic abnormalities. -No active inflammation, granulomas, cryptitis, or dysplasia present.  L. RECTUM, BIOPSY: -Colonic mucosa with marked active colitis, see comment. -No granulomas or dysplasia present.  DIAGNOSIS COMMENT  Part L: There are no granulomas or significant architectural distortion present.  While the findings may be consistent with an acute self-limited colitis, the possibility of inflammatory bowel disease cannot be completely excluded.  Recommend clinical and endoscopic correlation.  eloise hx.   h/o chronic constipation-most of her life.  she has bm qQOD- every 3 days drinks coffee to have a bm  had a egd/colonoscopy when she was younger-age 15- 16-, eating d/o contributed to constipation. used to be addicted to laxatives- dulcolax, exlax. she believes results were unremarkable.  she is pescatarian . does not get her period on current OCP so last period 1.5 years ago.   white stuff in stool for last few months. lot of mucous recently. very constipated 2 weeks ago when skiing. Had abd pain.  was only able to mass mucous. took a laxative with good result, abd pain resolved.  some BRB on occasion, -mostly on tissue with wiping.   started taking iron pills summer /fall 2024. was also started on fiber pills at that time to help manage constipation. fiber did help with constipation.   takes advil frequently for HA, and for MSK pain in hip/low back   has occ heartburn weight currently stable at 104 lbs  Patient denies n/v, dysphagia/odynophagia, change in bowel habits, diarrhea,  melena. no weight loss. no jaundice/pruritus/fatigue.  Good appetite.  PCP Chloé Acosta  Soc: no tobacco or significant EtOH   Family Hx: no significant GI family history including colon cancer, stomach cancer, IBD, celiac   ROS: constitutional: no weight loss, fevers ENT: no deafness Eyes: no blindness Neck: no lymphadenopathy Chest: no shortness of breath, no cough Cardiac: no chest pain, no palpitations Vascular: no leg swelling GI: as noted in hpi : no dysuria, dark urine Skin: no rashes, lesions, jaundice Heme: no bleeding Endocrine: no DM unless otherwise stated in HPI   Physical Exam: (VS noted below) General: alert, comfortable, in no acute distress Eyes: normal sclera, anicteric Neck: normal, supple, no neck mass Pulm: no respiratory distress, clear to auscultation bilaterally Heart: RRR, normal S1 S2 Abd: Soft, non-tender, non-distended, normal bowel sounds, no appreciable hepatosplenomegaly, no masses palpated Rectal: normal external exam. normal JENNIFER. white mucous on glove. Chaperoned by Joaquina Silvestre RN.  Ext: warm and well perfused, no edema Skin: no rashes, no jaundice Neuro: alert, grossly nonfocal   Labs/imaging/prior endoscopic results were reviewed to the extent available and pertinent findings noted in HPI

## 2025-04-25 NOTE — ASSESSMENT
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  reviewed natural history of disease, causes, treatment options, prognosis.  stool tests neg for infection, calpro 600 discussed possible EIM-seen by Salbador, did trial of medrol dose pack no clear response to canasa suppository after 3 weeks but hard to assess with residue from canasa suppository, discussed addition of oral asa vs. hydrocortisone suppository. primary concern is fatigue so patient elected for hydrocortisone suppository.  due to persistent fatigue, osteopenia, will obtain MRE, r/o Crohns disease she will repeat labs and calprotectin in 2-3 weeks  if no improvement,  can consider oral mesalamine but due to concern for worsening fatigue would opt for budesonide MMX counseled patient on risk of side effects f/u in 3 weeks- can be virtual as patient is moving to [place] will plan for flex sig in 3 months with anesthesia to confirm remission or sooner if needed.  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   iron deficiency- improved on recent labs will hold iron for now as patient feels it contributes to constipation. will monitor  hip pain/rash ?EIM of IBD-has seen Rheum. will see derm.   patient granted permission to discuss her case with her parents . discussed with her mother and father.

## 2025-04-25 NOTE — ASSESSMENT
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  reviewed natural history of disease, causes, treatment options, prognosis.  stool tests neg for infection, calpro 600 discussed possible EIM-seen by Salbador, did trial of medrol dose pack no clear response to canasa suppository after 3 weeks but hard to assess with residue from canasa suppository, discussed addition of oral asa vs. hydrocortisone suppository. primary concern is fatigue so patient elected for hydrocortisone suppository.  due to persistent fatigue, osteopenia, will obtain MRE, r/o Crohns disease she will repeat labs and calprotectin in 2-3 weeks  if no improvement,  can consider oral mesalamine but due to concern for worsening fatigue would opt for budesonide MMX counseled patient on risk of side effects f/u in 3 weeks- can be virtual as patient is moving to [place] will plan for flex sig in 3 months with anesthesia to confirm remission or sooner if needed.  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   iron deficiency- improved on recent labs will hold iron for now as patient feels it contributes to constipation. will monitor  hip pain/rash ?EIM of IBD-has seen Rheum. will see derm.   patient granted permission to discuss her case with her parents . discussed with her mother and father.

## 2025-04-25 NOTE — ASSESSMENT
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  reviewed natural history of disease, causes, treatment options, prognosis.  stool tests neg for infection, calpro 600 discussed possible EIM-seen by Salbador, did trial of medrol dose pack no clear response to canasa suppository after 3 weeks but hard to assess with residue from canasa suppository, discussed addition of oral asa vs. hydrocortisone suppository. primary concern is fatigue so patient elected for hydrocortisone suppository.  due to persistent fatigue, osteopenia, will obtain MRE, r/o Crohns disease she will repeat labs and calprotectin in 2-3 weeks  if no improvement,  can consider oral mesalamine but due to concern for worsening fatigue would opt for budesonide MMX counseled patient on risk of side effects f/u in 3 weeks- can be virtual as patient is moving to [place] will plan for flex sig in 3 months with anesthesia to confirm remission or sooner if needed.  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   iron deficiency- improved on recent labs will hold iron for now as patient feels it contributes to constipation. will monitor  hip pain/rash ?EIM of IBD-has seen Rheum. will see derm.   patient granted permission to discuss her case with her parents . discussed with her mother and father.

## 2025-05-08 NOTE — HISTORY OF PRESENT ILLNESS
[FreeTextEntry1] : Patient continues to manifest recurrent episodes of severe sacroiliitis, at times being debilitating and necessitating prolonged bedrest. She also reports chronic generalized joint pain and stiffness, also flaring up at times.

## 2025-05-08 NOTE — PHYSICAL EXAM
[General Appearance - Alert] : alert [General Appearance - In No Acute Distress] : in no acute distress [General Appearance - Well Nourished] : well nourished [General Appearance - Well Developed] : well developed [Sclera] : the sclera and conjunctiva were normal [] : no rash [Motor Exam] : the motor exam was normal [FreeTextEntry1] : There is no evidence of active synovitis throughout all joints examined. There is no dactylitis or plantar findings There is no pain on stressing the SI joints during provocative testing.

## 2025-05-15 NOTE — HISTORY OF PRESENT ILLNESS
[Home] : at home, [unfilled] , at the time of the visit. [Medical Office: ([facility])___] : at the medical office located in  [Telephone (audio)] : This telephonic visit was provided via audio only technology. [No access to tele-video equipment] : patient lacks access to tele-video equipment. [Verbal consent obtained from patient] : the patient, [unfilled] [FreeTextEntry1] : 23 yo F presents for follow up of proctitis.   fecal calprotectin -now 75 after starting mesalamine suppositories and hydrocortisone suppositories.   mucous stopped for a while, now more bloody mucous in the last few days. stopped suppository 1 week ago. She has difficulty maintaining compliance with suppository due to her active life.   she would like to try Uceris.  she was approved for Humira. will start soon.     5/7/25:spoke with patient. reviewed MRE. no active inflammation. pancreas divisum noted. repeat labs/calpro pending. some decrease mucous with mesalamine/hydrocort suppository. patient concerned about mesalamine suppsitory/oral contributing to fatigue. Will treat proctits with Uceris 9 mg daily x 8 weeks and repeat flex sig at that time. Discussed plan with Dr. Conley to start Humira for SpA. no objection to starting Humira. Encouraged her to establish care with GI in locally in [place] as she will be there for 2 years. labs 5/7- normal cbc, cmet, crp, calpro 75     recnet labs , normal CBC, CMET, iron studies Ferritie now 30, b12/folate, crp, negative cealic serologies, normal tsh.   has done suppository for 2-3 weeks. forgot a couple of times but has been mostly compliant. Hard to tell if improvement in symptoms still seeing mucous. feels there is less blood.  still feels fatigued which is her primary concern.  did trial of steroids with rheum - did not notice any significant change in joint pain or bowels.  will f/u with Dr. foto. may consider Indocin.  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  stool tests with calpro- 600, negative for infection  Physical Exam: (VS noted below) General: alert, comfortable, in no acute distress Eyes: normal sclera, anicteric Neck: normal, supple, no neck mass Pulm: no respiratory distress, clear to auscultation bilaterally Heart: RRR, normal S1 S2 Abd: Soft, non-tender, non-distended, normal bowel sounds, no appreciable hepatosplenomegaly, no masses palpated Rectal: deferred Ext: warm and well perfused, no edema Skin: no rashes, no jaundice Neuro: alert, grossly nonfocal   Labs/imaging/prior endoscopic results were reviewed to the extent available and pertinent findings noted in HPI    prior hx.  back to baseline after procedure. she has had intermittent rectal pain, brb  and mucous on tissue for 1-2 years.  denies NSAID use. Has not yet submitted stool tests.  Had norovirus when in [place]  3 months ago, only had vomiting at that time.  takes allerflex  due to occasional hives which she started having after she had mono a few years ago.  has intermittent severe hip pain-unclear etiology-, takes NSAID occasionally for this. will see PMR&R.   EGD/Colonoscopy performed.  mild irregular zline, anal fissure, protitis 0-4 cm. 2 mm descending colon polyp,  A. DUODENUM, BIOPSY: -Duodenal mucosa with peptic duodenitis. -No evidence of celiac disease or parasites seen.  B. STOMACH, ANTRUM, BIOPSY: -Gastric antral mucosa with foveolar hyperplasia and mild chronic gastritis. -No intestinal metaplasia or dysplasia present. -No Helicobacter pylori seen on Diff Quik stain.  C. STOMACH, BODY, BIOPSY: -Gastric oxyntic mucosa with mild chronic gastritis. -No intestinal metaplasia or dysplasia present. -No Helicobacter pylori seen on Diff Quik stain.  D. GE JUNCTION, 36 CM, BIOPSY: -Squamocolumnar junctional mucosa with acute and chronic inflammation and changes consistent with reflux esophagitis. -No intestinal metaplasia or dysplasia present. -No increase in intraepithelial eosinophils seen.  E. DESCENDING COLON, POLYP, BIOPSY: -Polypoid fragment of colonic mucosa with hyperplastic features and prominent lymphoid aggregate. -No adenoma present.  F. TERMINAL ILEUM, BIOPSY: -Small bowel mucosa with no diagnostic abnormalities. -No active inflammation, granulomas, cryptitis, or dysplasia present.  G. CECUM, BIOPSY: -Colonic mucosa with no diagnostic abnormalities. -No active inflammation, granulomas, cryptitis, or dysplasia present.  H. ASCENDING COLON, BIOPSY: -Colonic mucosa with no diagnostic abnormalities. -No active inflammation, granulomas, cryptitis, or dysplasia present.  I. TRANSVERSE COLON, BIOPSY: -Colonic mucosa with no diagnostic abnormalities. -No active inflammation, granulomas, cryptitis, or dysplasia present.  J. DESCENDING COLON, BIOPSY: -Colonic mucosa with no diagnostic abnormalities. -No active inflammation, granulomas, cryptitis, or dysplasia present.  K. SIGMOID COLON, BIOPSY:   Specimen: [id number]            Received: 03/24/                (Continued)  FINAL DIAGNOSIS                  (Continued)  -Colonic mucosa with no diagnostic abnormalities. -No active inflammation, granulomas, cryptitis, or dysplasia present.  L. RECTUM, BIOPSY: -Colonic mucosa with marked active colitis, see comment. -No granulomas or dysplasia present.  DIAGNOSIS COMMENT  Part L: There are no granulomas or significant architectural distortion present.  While the findings may be consistent with an acute self-limited colitis, the possibility of inflammatory bowel disease cannot be completely excluded.  Recommend clinical and endoscopic correlation.  eloise hx.   h/o chronic constipation-most of her life.  she has bm qQOD- every 3 days drinks coffee to have a bm  had a egd/colonoscopy when she was younger-age 15- 16-, eating d/o contributed to constipation. used to be addicted to laxatives- dulcolax, exlax. she believes results were unremarkable.  she is pescatarian . does not get her period on current OCP so last period 1.5 years ago.   white stuff in stool for last few months. lot of mucous recently. very constipated 2 weeks ago when skiing. Had abd pain.  was only able to mass mucous. took a laxative with good result, abd pain resolved.  some BRB on occasion, -mostly on tissue with wiping.   started taking iron pills summer /fall 2024. was also started on fiber pills at that time to help manage constipation. fiber did help with constipation.   takes advil frequently for HA, and for MSK pain in hip/low back   has occ heartburn weight currently stable at 104 lbs  Patient denies n/v, dysphagia/odynophagia, change in bowel habits, diarrhea,  melena. no weight loss. no jaundice/pruritus/fatigue.  Good appetite.  PCP Chloé Acosta  Soc: no tobacco or significant EtOH   Family Hx: no significant GI family history including colon cancer, stomach cancer, IBD, celiac   ROS: constitutional: no weight loss, fevers ENT: no deafness Eyes: no blindness Neck: no lymphadenopathy Chest: no shortness of breath, no cough Cardiac: no chest pain, no palpitations Vascular: no leg swelling GI: as noted in hpi : no dysuria, dark urine Skin: no rashes, lesions, jaundice Heme: no bleeding Endocrine: no DM unless otherwise stated in HPI   Physical Exam: (VS noted below) General: alert, comfortable, in no acute distress Eyes: normal sclera, anicteric Neck: normal, supple, no neck mass Pulm: no respiratory distress, clear to auscultation bilaterally Heart: RRR, normal S1 S2 Abd: Soft, non-tender, non-distended, normal bowel sounds, no appreciable hepatosplenomegaly, no masses palpated Rectal: deferred Ext: warm and well perfused, no edema Skin: no rashes, no jaundice Neuro: alert, grossly nonfocal   Labs/imaging/prior endoscopic results were reviewed to the extent available and pertinent findings noted in HPI

## 2025-05-15 NOTE — HISTORY OF PRESENT ILLNESS
[Home] : at home, [unfilled] , at the time of the visit. [Medical Office: ([facility])___] : at the medical office located in  [Telephone (audio)] : This telephonic visit was provided via audio only technology. [No access to tele-video equipment] : patient lacks access to tele-video equipment. [Verbal consent obtained from patient] : the patient, [unfilled] [FreeTextEntry1] : 23 yo F presents for follow up of proctitis.   fecal calprotectin -now 75 after starting mesalamine suppositories and hydrocortisone suppositories.   mucous stopped for a while, now more bloody mucous in the last few days. stopped suppository 1 week ago. She has difficulty maintaining compliance with suppository due to her active life.   she would like to try Uceris.  she was approved for Humira. will start soon.     5/7/25:spoke with patient. reviewed MRE. no active inflammation. pancreas divisum noted. repeat labs/calpro pending. some decrease mucous with mesalamine/hydrocort suppository. patient concerned about mesalamine suppsitory/oral contributing to fatigue. Will treat proctits with Uceris 9 mg daily x 8 weeks and repeat flex sig at that time. Discussed plan with Dr. Conley to start Humira for SpA. no objection to starting Humira. Encouraged her to establish care with GI in locally in [place] as she will be there for 2 years. labs 5/7- normal cbc, cmet, crp, calpro 75     recnet labs , normal CBC, CMET, iron studies Ferritie now 30, b12/folate, crp, negative cealic serologies, normal tsh.   has done suppository for 2-3 weeks. forgot a couple of times but has been mostly compliant. Hard to tell if improvement in symptoms still seeing mucous. feels there is less blood.  still feels fatigued which is her primary concern.  did trial of steroids with rheum - did not notice any significant change in joint pain or bowels.  will f/u with Dr. foto. may consider Indocin.  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  stool tests with calpro- 600, negative for infection  Physical Exam: (VS noted below) General: alert, comfortable, in no acute distress Eyes: normal sclera, anicteric Neck: normal, supple, no neck mass Pulm: no respiratory distress, clear to auscultation bilaterally Heart: RRR, normal S1 S2 Abd: Soft, non-tender, non-distended, normal bowel sounds, no appreciable hepatosplenomegaly, no masses palpated Rectal: deferred Ext: warm and well perfused, no edema Skin: no rashes, no jaundice Neuro: alert, grossly nonfocal   Labs/imaging/prior endoscopic results were reviewed to the extent available and pertinent findings noted in HPI    prior hx.  back to baseline after procedure. she has had intermittent rectal pain, brb  and mucous on tissue for 1-2 years.  denies NSAID use. Has not yet submitted stool tests.  Had norovirus when in [place]  3 months ago, only had vomiting at that time.  takes allerflex  due to occasional hives which she started having after she had mono a few years ago.  has intermittent severe hip pain-unclear etiology-, takes NSAID occasionally for this. will see PMR&R.   EGD/Colonoscopy performed.  mild irregular zline, anal fissure, protitis 0-4 cm. 2 mm descending colon polyp,  A. DUODENUM, BIOPSY: -Duodenal mucosa with peptic duodenitis. -No evidence of celiac disease or parasites seen.  B. STOMACH, ANTRUM, BIOPSY: -Gastric antral mucosa with foveolar hyperplasia and mild chronic gastritis. -No intestinal metaplasia or dysplasia present. -No Helicobacter pylori seen on Diff Quik stain.  C. STOMACH, BODY, BIOPSY: -Gastric oxyntic mucosa with mild chronic gastritis. -No intestinal metaplasia or dysplasia present. -No Helicobacter pylori seen on Diff Quik stain.  D. GE JUNCTION, 36 CM, BIOPSY: -Squamocolumnar junctional mucosa with acute and chronic inflammation and changes consistent with reflux esophagitis. -No intestinal metaplasia or dysplasia present. -No increase in intraepithelial eosinophils seen.  E. DESCENDING COLON, POLYP, BIOPSY: -Polypoid fragment of colonic mucosa with hyperplastic features and prominent lymphoid aggregate. -No adenoma present.  F. TERMINAL ILEUM, BIOPSY: -Small bowel mucosa with no diagnostic abnormalities. -No active inflammation, granulomas, cryptitis, or dysplasia present.  G. CECUM, BIOPSY: -Colonic mucosa with no diagnostic abnormalities. -No active inflammation, granulomas, cryptitis, or dysplasia present.  H. ASCENDING COLON, BIOPSY: -Colonic mucosa with no diagnostic abnormalities. -No active inflammation, granulomas, cryptitis, or dysplasia present.  I. TRANSVERSE COLON, BIOPSY: -Colonic mucosa with no diagnostic abnormalities. -No active inflammation, granulomas, cryptitis, or dysplasia present.  J. DESCENDING COLON, BIOPSY: -Colonic mucosa with no diagnostic abnormalities. -No active inflammation, granulomas, cryptitis, or dysplasia present.  K. SIGMOID COLON, BIOPSY:   Specimen: [id number]            Received: 03/24/                (Continued)  FINAL DIAGNOSIS                  (Continued)  -Colonic mucosa with no diagnostic abnormalities. -No active inflammation, granulomas, cryptitis, or dysplasia present.  L. RECTUM, BIOPSY: -Colonic mucosa with marked active colitis, see comment. -No granulomas or dysplasia present.  DIAGNOSIS COMMENT  Part L: There are no granulomas or significant architectural distortion present.  While the findings may be consistent with an acute self-limited colitis, the possibility of inflammatory bowel disease cannot be completely excluded.  Recommend clinical and endoscopic correlation.  eloise hx.   h/o chronic constipation-most of her life.  she has bm qQOD- every 3 days drinks coffee to have a bm  had a egd/colonoscopy when she was younger-age 15- 16-, eating d/o contributed to constipation. used to be addicted to laxatives- dulcolax, exlax. she believes results were unremarkable.  she is pescatarian . does not get her period on current OCP so last period 1.5 years ago.   white stuff in stool for last few months. lot of mucous recently. very constipated 2 weeks ago when skiing. Had abd pain.  was only able to mass mucous. took a laxative with good result, abd pain resolved.  some BRB on occasion, -mostly on tissue with wiping.   started taking iron pills summer /fall 2024. was also started on fiber pills at that time to help manage constipation. fiber did help with constipation.   takes advil frequently for HA, and for MSK pain in hip/low back   has occ heartburn weight currently stable at 104 lbs  Patient denies n/v, dysphagia/odynophagia, change in bowel habits, diarrhea,  melena. no weight loss. no jaundice/pruritus/fatigue.  Good appetite.  PCP Chloé Acosta  Soc: no tobacco or significant EtOH   Family Hx: no significant GI family history including colon cancer, stomach cancer, IBD, celiac   ROS: constitutional: no weight loss, fevers ENT: no deafness Eyes: no blindness Neck: no lymphadenopathy Chest: no shortness of breath, no cough Cardiac: no chest pain, no palpitations Vascular: no leg swelling GI: as noted in hpi : no dysuria, dark urine Skin: no rashes, lesions, jaundice Heme: no bleeding Endocrine: no DM unless otherwise stated in HPI   Physical Exam: (VS noted below) General: alert, comfortable, in no acute distress Eyes: normal sclera, anicteric Neck: normal, supple, no neck mass Pulm: no respiratory distress, clear to auscultation bilaterally Heart: RRR, normal S1 S2 Abd: Soft, non-tender, non-distended, normal bowel sounds, no appreciable hepatosplenomegaly, no masses palpated Rectal: deferred Ext: warm and well perfused, no edema Skin: no rashes, no jaundice Neuro: alert, grossly nonfocal   Labs/imaging/prior endoscopic results were reviewed to the extent available and pertinent findings noted in HPI

## 2025-05-15 NOTE — ASSESSMENT
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  reviewed natural history of disease, causes, treatment options, prognosis.  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  MRE wnl patient does not wish to continue with suppositories due to difficulty with compliance with her active life.  She would like to try Uceris, she is concerned about risk of side effects from PO mesalamine.  counseled patient on risk of side effects from Uceris  will plan for flex sig in  2-3 months with anesthesia to confirm remission or sooner if needed.Patient will schedule for 6/23.   she is now living in [facility]. encouraged her to establish care locally.    iron deficiency- improved on recent labs will hold iron for now as patient feels it contributes to constipation. will monitor  hip pain/sacroileitis/EIM of IBD-has seen Rheum. planning for Humira.   patient granted permission to discuss her case with her parents . discussed with her mother and father.

## 2025-05-15 NOTE — ASSESSMENT
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  reviewed natural history of disease, causes, treatment options, prognosis.  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  MRE wnl patient does not wish to continue with suppositories due to difficulty with compliance with her active life.  She would like to try Uceris, she is concerned about risk of side effects from PO mesalamine.  counseled patient on risk of side effects from Uceris  will plan for flex sig in  2-3 months with anesthesia to confirm remission or sooner if needed.Patient will schedule for 6/23.   she is now living in [facility]. encouraged her to establish care locally.    iron deficiency- improved on recent labs will hold iron for now as patient feels it contributes to constipation. will monitor  hip pain/sacroileitis/EIM of IBD-has seen Rheum. planning for Humira.   patient granted permission to discuss her case with her parents . discussed with her mother and father.